# Patient Record
Sex: FEMALE | Race: WHITE | NOT HISPANIC OR LATINO | URBAN - NONMETROPOLITAN AREA
[De-identification: names, ages, dates, MRNs, and addresses within clinical notes are randomized per-mention and may not be internally consistent; named-entity substitution may affect disease eponyms.]

---

## 2019-03-25 ENCOUNTER — IMPORTED ENCOUNTER (OUTPATIENT)
Dept: URBAN - NONMETROPOLITAN AREA CLINIC 1 | Facility: CLINIC | Age: 74
End: 2019-03-25

## 2019-03-25 PROBLEM — H04.123: Noted: 2019-03-25

## 2019-03-25 PROBLEM — G45.9: Noted: 2019-03-25

## 2019-03-25 PROBLEM — Z96.1: Noted: 2019-03-25

## 2019-03-25 PROCEDURE — 92004 COMPRE OPH EXAM NEW PT 1/>: CPT

## 2019-03-25 PROCEDURE — 92083 EXTENDED VISUAL FIELD XM: CPT

## 2019-03-25 NOTE — PATIENT DISCUSSION
ANH-Explained ANH and associated symptoms.-Recommend increasing Omega 3s.-Pt to begin artificial tears OU QID PRN. Pt will contact us if this does not provide relief. Patient has history of many mini-strokes but there are no signs of plaque or blockage in retina. Patient was encouraged to find another neurologist to handle care. VF ordered due to history of CVAs and complaint of areas missing in vision OU.

## 2019-03-28 ENCOUNTER — IMPORTED ENCOUNTER (OUTPATIENT)
Dept: URBAN - NONMETROPOLITAN AREA CLINIC 1 | Facility: CLINIC | Age: 74
End: 2019-03-28

## 2019-04-29 ENCOUNTER — IMPORTED ENCOUNTER (OUTPATIENT)
Dept: URBAN - NONMETROPOLITAN AREA CLINIC 1 | Facility: CLINIC | Age: 74
End: 2019-04-29

## 2019-04-29 PROCEDURE — 99213 OFFICE O/P EST LOW 20 MIN: CPT

## 2019-04-29 NOTE — PATIENT DISCUSSION
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  -Patient was encouraged to find another neurologist to handle care. VF 24-2 done previously due to history of CVAs and complaint of areas missing in vision OU. Reviewed today with patient stable no loss shown better than last VF in 2015. ANH-Explained ANH and associated symptoms.-Recommend increasing Omega 3s.-Pt to increase artificial tears OU QID PRN. Pt will contact us if this does not provide relief.

## 2019-10-28 ENCOUNTER — IMPORTED ENCOUNTER (OUTPATIENT)
Dept: URBAN - NONMETROPOLITAN AREA CLINIC 1 | Facility: CLINIC | Age: 74
End: 2019-10-28

## 2019-10-28 PROCEDURE — 92014 COMPRE OPH EXAM EST PT 1/>: CPT

## 2020-04-27 ENCOUNTER — IMPORTED ENCOUNTER (OUTPATIENT)
Dept: URBAN - NONMETROPOLITAN AREA CLINIC 1 | Facility: CLINIC | Age: 75
End: 2020-04-27

## 2020-04-27 PROCEDURE — 99213 OFFICE O/P EST LOW 20 MIN: CPT

## 2020-04-27 NOTE — PATIENT DISCUSSION
ANH OU-  discussed findings w/patient-  increase AT's usage---recommended Refresh Curtis 3 at least TID OU-  stay hydrated at least 8 glasses water daily-  consider humidifier-  consider Omega 3 supplements-recommended PRN or [employer]--handout given-  discussed w/patient that decreased vision is likely related to dry eyes OS>OD-  RTC 6 mo Complete w/30-2 VF; 's Notes: MR 4/27/[id number] VF

## 2020-10-29 ENCOUNTER — IMPORTED ENCOUNTER (OUTPATIENT)
Dept: URBAN - NONMETROPOLITAN AREA CLINIC 1 | Facility: CLINIC | Age: 75
End: 2020-10-29

## 2020-10-29 PROBLEM — H16.223: Noted: 2021-01-25

## 2020-10-29 PROBLEM — G45.9: Noted: 2020-10-29

## 2020-10-29 PROBLEM — Z96.1: Noted: 2020-10-29

## 2020-10-29 PROCEDURE — 92083 EXTENDED VISUAL FIELD XM: CPT

## 2020-10-29 PROCEDURE — 99213 OFFICE O/P EST LOW 20 MIN: CPT

## 2020-10-29 NOTE — PATIENT DISCUSSION
H/o CVAs/TIAs-  discussed findings w/patient-  30-2 VF done 10/29/2020    OD: superior limitation scattered scotoma non-specific defects stable    OS: mild superior limitation scattered scotoma non-specific defects stable     No neurological defects noted OD/OS -  continue to monitor as scheduled or prnDES OU-  discussed findings w/patient-  recommended Refresh Curtis 3 at least TID OU-  stay hydrated at least 8 glasses water daily-  consider humidifier-  continue Fish Oil supplements-  RTC 6 mo f/u or prnPseudophakia w/Open Capsules OU-  discussed findings w/patient-  doing well at this time-  continue to monitor yearly or prn; 's Notes: MR 4/27/[id number] VF 10/29/2020

## 2021-04-29 ENCOUNTER — IMPORTED ENCOUNTER (OUTPATIENT)
Dept: URBAN - NONMETROPOLITAN AREA CLINIC 1 | Facility: CLINIC | Age: 76
End: 2021-04-29

## 2021-04-29 PROBLEM — H53.19: Noted: 2021-04-29

## 2021-04-29 PROBLEM — H16.223: Noted: 2021-04-29

## 2021-04-29 PROBLEM — G45.9: Noted: 2020-10-29

## 2021-04-29 PROBLEM — Z96.1: Noted: 2021-04-29

## 2021-04-29 PROCEDURE — 99213 OFFICE O/P EST LOW 20 MIN: CPT

## 2021-04-29 NOTE — PATIENT DISCUSSION
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 She says she can feel it coming on in the area of her   eyebrows just above her eyes. She reports that this happens about once a day   for at least 6 mo.  -  30-2 VF done 10/29/2020    OD: superior limitation scattered scotoma non-specific defects stable    OS: mild superior limitation scattered scotoma non-specific defects stable     No neurological defects noted OD/OS-  Patient had a CT or MRI (she is not sure which) of the head on Jan 2021-  Refer patient to Dr. Tomasa Snider for further evalDES OU-  discussed findings w/patient-  recommended Refresh Curtis 3 at least TID OU-  stay hydrated at least 8 glasses water daily-  consider humidifier-  continue Fish Oil supplements-  RTC 6 mo f/u or prnPseudophakia w/Open Capsules OU-  discussed findings w/patient-  doing well at this time-  continue to monitor yearly or prnH/o Basal Cell Carcinoma Left Side-  discussed findings w/patient-  skin cancer was between nose and lower eyelid area-  patient had it removed in Jan 2021; Dr's Notes: MR 4/29/2021DFE 4/29/[id number] VF 10/29/2020

## 2022-04-09 ASSESSMENT — VISUAL ACUITY
OD_SC: J2
OS_PH: 20/30-
OS_CC: 20/40-
OU_SC: J1
OD_PH: 20/25-2
OD_CC: 20/40+2
OD_PH: 20/25
OU_SC: 20/20
OD_CC: 20/40+2
OU_CC: 20/30-
OD_CC: 20/30
OS_CC: 20/40-1
OD_CC: 20/30
OD_CC: 20/30-1
OU_CC: 20/25-2
OS_CC: 20/40
OS_PH: 20/40-1
OS_CC: 20/30
OS_SC: J3
OS_CC: 20/60+2
OD_CC: 20/30+2
OU_CC: 20/30-2
OS_PH: 20/30-1
OS_CC: 20/50
OS_PH: 20/40+2
OU_SC: 20/20

## 2022-04-09 ASSESSMENT — TONOMETRY
OD_IOP_MMHG: 14
OS_IOP_MMHG: 14
OD_IOP_MMHG: 14
OS_IOP_MMHG: 14
OS_IOP_MMHG: 15
OD_IOP_MMHG: 14
OS_IOP_MMHG: 15
OD_IOP_MMHG: 14

## 2024-06-20 ENCOUNTER — NEW PATIENT (OUTPATIENT)
Dept: RURAL CLINIC 1 | Facility: CLINIC | Age: 79
End: 2024-06-20

## 2024-06-20 DIAGNOSIS — H53.19: ICD-10-CM

## 2024-06-20 DIAGNOSIS — Z96.1: ICD-10-CM

## 2024-06-20 DIAGNOSIS — H16.223: ICD-10-CM

## 2024-06-20 PROCEDURE — 92004 COMPRE OPH EXAM NEW PT 1/>: CPT

## 2024-06-20 ASSESSMENT — TONOMETRY
OD_IOP_MMHG: 17
OS_IOP_MMHG: 17

## 2024-06-20 ASSESSMENT — VISUAL ACUITY
OD_SC: 20/40
OS_PH: 20/40-1
OS_SC: 20/50-2
OU_SC: 20/30-2